# Patient Record
(demographics unavailable — no encounter records)

---

## 2017-09-20 NOTE — OPERATIVE REPORT E
Operative Report



NAME: LUIS PARKS

MRN:  [id number]          : 1984 AGE:  33Y

DATE OF SURGERY: 2017              ROOM:



PREOPERATIVE DIAGNOSIS:

Left labial nodule and hematuria.



POSTOPERATIVE DIAGNOSIS:

Hematuria, cause undetermined, and scar tissue from previous left labial

abscess.



OPERATION:

Cysto and pelvic under anesthesia.



SURGEON:

JADA DELEON M.D.



ANESTHESIA:

General.



FINDINGS AT THE TIME OF SURGERY:

The vulva had a scar where she had had a previous left labial abscess.  It

seemed to be pretty well resolved so nothing was done with this.  The

urethral opening appeared to be normal.  Pray glands were normal.  No

Bartholin cyst.  The vagina had no discharge.  Cervix was within normal

limits.  Uterus was mid plane, not enlarged.  Adnexa:  No masses were

appreciated.



PROCEDURE:

The patient was brought into the OR, placed on a table in a supine

position, inducted under general anesthesia.  Following this she was

repositioned in a dorsal lithotomy position, prepped and draped in a

sterile fashion.  The bladder was drained of 50 mL of clear yellow urine

and a sample was sent for a urinalysis and culture.  The cystoscope was

gently inserted with the saline running through the external urethral

meatus, carried through the internal meatus as the saline was running.  It

was allowed to fill up.  The bladder was inspected.  There was no evidence

of any hemorrhages and no petechia.  The trigone and ureteral orifices

were within normal limits.  The dome of the bladder appeared to be normal.

The saline was removed and the cystoscope was removed.  Attention was

turned to the left labial nodule.  This was inspected.  At this point in

time there was no drainage.  This appeared to be a nodule secondary to a

residual abscess that had healed.  With this in mind we decided not to

proceed in lieu of the fact that we may make it worse by increasing the

amount of scar tissue in this area.  This terminated the procedure.  The

patient was put back into the supine position and anesthesia was

discontinued.  She was transferred to the recovery room in satisfactory

condition.  Negligible blood loss.





DICTATING PHYSICIAN:  JADA DELEON M.D.





[id number]                  DT: 2017    0844

PHY#: [id number]            DD: [id number]

ID:   [id number]           JOB#: [id number]       ACCT: [id number]



cc:JADA DELEON M.D.

>